# Patient Record
Sex: FEMALE | Race: WHITE | NOT HISPANIC OR LATINO | Employment: FULL TIME | URBAN - METROPOLITAN AREA
[De-identification: names, ages, dates, MRNs, and addresses within clinical notes are randomized per-mention and may not be internally consistent; named-entity substitution may affect disease eponyms.]

---

## 2019-09-10 PROBLEM — E03.8 HYPOTHYROIDISM DUE TO HASHIMOTO'S THYROIDITIS: Status: ACTIVE | Noted: 2019-09-10

## 2019-09-10 PROBLEM — E06.3 HYPOTHYROIDISM DUE TO HASHIMOTO'S THYROIDITIS: Status: ACTIVE | Noted: 2019-09-10

## 2019-09-10 PROBLEM — R53.83 FATIGUE, UNSPECIFIED TYPE: Status: ACTIVE | Noted: 2019-09-10

## 2019-09-10 PROBLEM — R14.0 ABDOMINAL BLOATING: Status: ACTIVE | Noted: 2019-09-10

## 2019-09-15 PROCEDURE — 82024 ASSAY OF ACTH: CPT | Performed by: INTERNAL MEDICINE

## 2020-09-22 ENCOUNTER — OFFICE VISIT (OUTPATIENT)
Dept: OBGYN | Age: 40
End: 2020-09-22

## 2020-09-22 ENCOUNTER — LAB SERVICES (OUTPATIENT)
Dept: LAB | Age: 40
End: 2020-09-22

## 2020-09-22 VITALS
BODY MASS INDEX: 26.02 KG/M2 | SYSTOLIC BLOOD PRESSURE: 92 MMHG | HEIGHT: 62 IN | RESPIRATION RATE: 16 BRPM | DIASTOLIC BLOOD PRESSURE: 72 MMHG | WEIGHT: 141.38 LBS

## 2020-09-22 DIAGNOSIS — Z11.51 SCREENING FOR HUMAN PAPILLOMAVIRUS (HPV): ICD-10-CM

## 2020-09-22 DIAGNOSIS — Z12.31 ENCOUNTER FOR SCREENING MAMMOGRAM FOR MALIGNANT NEOPLASM OF BREAST: ICD-10-CM

## 2020-09-22 DIAGNOSIS — Z01.419 ENCOUNTER FOR GYNECOLOGICAL EXAMINATION WITHOUT ABNORMAL FINDING: ICD-10-CM

## 2020-09-22 DIAGNOSIS — Z01.419 ENCOUNTER FOR GYNECOLOGICAL EXAMINATION WITHOUT ABNORMAL FINDING: Primary | ICD-10-CM

## 2020-09-22 DIAGNOSIS — R10.2 PELVIC PAIN IN FEMALE: ICD-10-CM

## 2020-09-22 LAB
BASOPHIL %: 0.3 % (ref 0–1.2)
BASOPHIL ABSOLUTE #: 0 10*3/UL (ref 0–0.1)
DIFFERENTIAL TYPE: ABNORMAL
EOSINOPHIL %: 1.5 % (ref 0–10)
EOSINOPHIL ABSOLUTE #: 0.1 10*3/UL (ref 0–0.5)
HEMATOCRIT: 41.8 % (ref 34–45)
HEMOGLOBIN: 13.4 G/DL (ref 11.2–15.7)
IMMATURE GRANULOCYTE ABSOLUTE: 0.01 10*3/UL (ref 0–0.05)
IMMATURE GRANULOCYTE PERCENT: 0.2 % (ref 0–0.5)
LYMPH PERCENT: 19.5 % (ref 20.5–51.1)
LYMPHOCYTE ABSOLUTE #: 1.2 10*3/UL (ref 1.2–3.4)
MEAN CORPUSCULAR HGB CONCENTRATION: 32.1 % (ref 32–36)
MEAN CORPUSCULAR HGB: 27.8 PG (ref 27–34)
MEAN CORPUSCULAR VOLUME: 86.7 FL (ref 79–95)
MEAN PLATELET VOLUME: 11.5 FL (ref 8.6–12.4)
MONOCYTE ABSOLUTE #: 0.5 10*3/UL (ref 0.2–0.9)
MONOCYTE PERCENT: 7.4 % (ref 4.3–12.9)
NEUTROPHIL ABSOLUTE #: 4.3 10*3/UL (ref 1.4–6.5)
NEUTROPHIL PERCENT: 71.1 % (ref 34–73.5)
PLATELET COUNT: 248 10*3/UL (ref 150–400)
RED BLOOD CELL COUNT: 4.82 10*6/UL (ref 3.7–5.2)
RED CELL DISTRIBUTION WIDTH: 12.8 % (ref 11.3–14.8)
WHITE BLOOD CELL COUNT: 6.1 10*3/UL (ref 4–10)

## 2020-09-22 PROCEDURE — 84443 ASSAY THYROID STIM HORMONE: CPT | Performed by: OBSTETRICS & GYNECOLOGY

## 2020-09-22 PROCEDURE — 88142 CYTOPATH C/V THIN LAYER: CPT | Performed by: PATHOLOGY

## 2020-09-22 PROCEDURE — 84439 ASSAY OF FREE THYROXINE: CPT | Performed by: OBSTETRICS & GYNECOLOGY

## 2020-09-22 PROCEDURE — 82947 ASSAY GLUCOSE BLOOD QUANT: CPT | Performed by: OBSTETRICS & GYNECOLOGY

## 2020-09-22 PROCEDURE — 99386 PREV VISIT NEW AGE 40-64: CPT | Performed by: OBSTETRICS & GYNECOLOGY

## 2020-09-22 PROCEDURE — 82670 ASSAY OF TOTAL ESTRADIOL: CPT | Performed by: OBSTETRICS & GYNECOLOGY

## 2020-09-22 PROCEDURE — 80061 LIPID PANEL: CPT | Performed by: OBSTETRICS & GYNECOLOGY

## 2020-09-22 PROCEDURE — 85025 COMPLETE CBC W/AUTO DIFF WBC: CPT | Performed by: OBSTETRICS & GYNECOLOGY

## 2020-09-22 PROCEDURE — 83001 ASSAY OF GONADOTROPIN (FSH): CPT | Performed by: OBSTETRICS & GYNECOLOGY

## 2020-09-22 PROCEDURE — 36415 COLL VENOUS BLD VENIPUNCTURE: CPT | Performed by: OBSTETRICS & GYNECOLOGY

## 2020-09-22 PROCEDURE — 83520 IMMUNOASSAY QUANT NOS NONAB: CPT | Performed by: OBSTETRICS & GYNECOLOGY

## 2020-09-22 RX ORDER — LEVOTHYROXINE SODIUM 137 UG/1
TABLET ORAL
COMMUNITY
Start: 2020-07-23 | End: 2022-11-11

## 2020-09-22 SDOH — HEALTH STABILITY: MENTAL HEALTH: HOW OFTEN DO YOU HAVE A DRINK CONTAINING ALCOHOL?: NEVER

## 2020-09-23 ENCOUNTER — E-ADVICE (OUTPATIENT)
Dept: OBGYN | Age: 40
End: 2020-09-23

## 2020-09-23 LAB
CHOLEST SERPL-MCNC: 128 MG/DL (ref 140–200)
ESTRADIOL SERPL-MCNC: 66.1 PG/ML
FSH SERPL-ACNC: 6.86 M[IU]/ML (ref 2–25)
GLUCOSE P FAST SERPL-MCNC: 91 MG/DL (ref 60–100)
HDLC SERPL-MCNC: 36 MG/DL
LDLC SERPL CALC-MCNC: 79 MG/DL (ref 30–100)
T4 FREE SERPL-MCNC: 0.9 NG/DL (ref 0.78–2.19)
TRIGL SERPL-MCNC: 67 MG/DL (ref 0–200)
TSH SERPL DL<=0.05 MIU/L-ACNC: 5.69 M[IU]/L (ref 0.3–4.82)

## 2020-09-26 ENCOUNTER — IMAGING SERVICES (OUTPATIENT)
Dept: MAMMOGRAPHY | Age: 40
End: 2020-09-26
Attending: OBSTETRICS & GYNECOLOGY

## 2020-09-26 DIAGNOSIS — Z12.31 ENCOUNTER FOR SCREENING MAMMOGRAM FOR MALIGNANT NEOPLASM OF BREAST: ICD-10-CM

## 2020-09-26 PROCEDURE — 77067 SCR MAMMO BI INCL CAD: CPT | Performed by: RADIOLOGY

## 2020-09-26 PROCEDURE — 77063 BREAST TOMOSYNTHESIS BI: CPT | Performed by: RADIOLOGY

## 2020-09-27 LAB — MIS SERPL-MCNC: 2.24 NG/ML

## 2020-09-28 ENCOUNTER — IMAGING SERVICES (OUTPATIENT)
Dept: ULTRASOUND IMAGING | Age: 40
End: 2020-09-28
Attending: OBSTETRICS & GYNECOLOGY

## 2020-09-28 DIAGNOSIS — R10.2 PELVIC PAIN IN FEMALE: ICD-10-CM

## 2020-09-28 PROCEDURE — 76856 US EXAM PELVIC COMPLETE: CPT | Performed by: RADIOLOGY

## 2020-09-28 PROCEDURE — 76830 TRANSVAGINAL US NON-OB: CPT | Performed by: RADIOLOGY

## 2020-09-30 ENCOUNTER — E-ADVICE (OUTPATIENT)
Dept: OBGYN | Age: 40
End: 2020-09-30

## 2020-09-30 ENCOUNTER — OFFICE VISIT (OUTPATIENT)
Dept: UROGYNECOLOGY | Age: 40
End: 2020-09-30

## 2020-09-30 VITALS
WEIGHT: 141.4 LBS | BODY MASS INDEX: 25.86 KG/M2 | HEART RATE: 76 BPM | RESPIRATION RATE: 14 BRPM | SYSTOLIC BLOOD PRESSURE: 100 MMHG | DIASTOLIC BLOOD PRESSURE: 60 MMHG

## 2020-09-30 DIAGNOSIS — N81.3 UTERINE PROCIDENTIA: ICD-10-CM

## 2020-09-30 DIAGNOSIS — N39.46 MIXED STRESS AND URGE URINARY INCONTINENCE: Primary | ICD-10-CM

## 2020-09-30 DIAGNOSIS — N81.9 VAGINAL VAULT PROLAPSE: ICD-10-CM

## 2020-09-30 DIAGNOSIS — N81.11 CYSTOCELE, MIDLINE: ICD-10-CM

## 2020-09-30 DIAGNOSIS — R92.30 DENSE BREAST TISSUE: Primary | ICD-10-CM

## 2020-09-30 DIAGNOSIS — N81.6 RECTOCELE: ICD-10-CM

## 2020-09-30 DIAGNOSIS — N81.89 LOSS OF PERINEAL BODY, FEMALE: ICD-10-CM

## 2020-09-30 DIAGNOSIS — Z12.39 SCREENING FOR MALIGNANT NEOPLASM OF BREAST: ICD-10-CM

## 2020-09-30 LAB — AP REPORT: NORMAL

## 2020-09-30 PROCEDURE — 99244 OFF/OP CNSLTJ NEW/EST MOD 40: CPT | Performed by: OBSTETRICS & GYNECOLOGY

## 2020-09-30 RX ORDER — LEVOTHYROXINE SODIUM 0.15 MG/1
150 TABLET ORAL EVERY MORNING
COMMUNITY
Start: 2020-09-29 | End: 2023-10-26 | Stop reason: SDUPTHER

## 2020-10-01 ENCOUNTER — TELEPHONE (OUTPATIENT)
Dept: OBGYN | Age: 40
End: 2020-10-01

## 2020-10-05 LAB — HPV I/H RISK 4 DNA CVX QL NAA+PROBE: NORMAL

## 2020-10-08 ENCOUNTER — TELEPHONE (OUTPATIENT)
Dept: OBGYN | Age: 40
End: 2020-10-08

## 2020-10-12 ENCOUNTER — E-ADVICE (OUTPATIENT)
Dept: OBGYN | Age: 40
End: 2020-10-12

## 2020-10-20 ENCOUNTER — TELEPHONE (OUTPATIENT)
Dept: OBGYN | Age: 40
End: 2020-10-20

## 2020-10-27 ENCOUNTER — HOSPITAL (OUTPATIENT)
Dept: OTHER | Age: 40
End: 2020-10-27
Attending: OBSTETRICS & GYNECOLOGY

## 2020-11-04 ENCOUNTER — OFFICE VISIT (OUTPATIENT)
Dept: UROGYNECOLOGY | Age: 40
End: 2020-11-04

## 2020-11-04 DIAGNOSIS — N81.6 RECTOCELE: ICD-10-CM

## 2020-11-04 DIAGNOSIS — N81.11 CYSTOCELE, MIDLINE: ICD-10-CM

## 2020-11-04 DIAGNOSIS — N39.46 MIXED STRESS AND URGE URINARY INCONTINENCE: Primary | ICD-10-CM

## 2020-11-04 DIAGNOSIS — N39.8 VOIDING DYSFUNCTION: ICD-10-CM

## 2020-11-04 DIAGNOSIS — N81.9 VAGINAL VAULT PROLAPSE: ICD-10-CM

## 2020-11-04 DIAGNOSIS — N81.3 UTERINE PROCIDENTIA: ICD-10-CM

## 2020-11-04 DIAGNOSIS — N81.89 LOSS OF PERINEAL BODY, FEMALE: ICD-10-CM

## 2020-11-04 PROCEDURE — 51797 INTRAABDOMINAL PRESSURE TEST: CPT | Performed by: OBSTETRICS & GYNECOLOGY

## 2020-11-04 PROCEDURE — 51798 US URINE CAPACITY MEASURE: CPT | Performed by: OBSTETRICS & GYNECOLOGY

## 2020-11-04 PROCEDURE — 51729 CYSTOMETROGRAM W/VP&UP: CPT | Performed by: OBSTETRICS & GYNECOLOGY

## 2020-11-04 PROCEDURE — 51784 ANAL/URINARY MUSCLE STUDY: CPT | Performed by: OBSTETRICS & GYNECOLOGY

## 2020-11-04 PROCEDURE — 51741 ELECTRO-UROFLOWMETRY FIRST: CPT | Performed by: OBSTETRICS & GYNECOLOGY

## 2020-11-04 PROCEDURE — 87086 URINE CULTURE/COLONY COUNT: CPT | Performed by: OBSTETRICS & GYNECOLOGY

## 2020-11-05 LAB — FINAL REPORT: NORMAL

## 2020-11-11 ENCOUNTER — APPOINTMENT (OUTPATIENT)
Dept: UROGYNECOLOGY | Age: 40
End: 2020-11-11

## 2020-11-17 ENCOUNTER — E-ADVICE (OUTPATIENT)
Dept: UROGYNECOLOGY | Age: 40
End: 2020-11-17

## 2020-11-21 ENCOUNTER — HOSPITAL ENCOUNTER (OUTPATIENT)
Age: 40
End: 2020-11-21
Attending: OBSTETRICS & GYNECOLOGY | Admitting: OBSTETRICS & GYNECOLOGY

## 2020-11-21 DIAGNOSIS — Z01.812 PRE-PROCEDURAL LABORATORY EXAMINATION: Primary | ICD-10-CM

## 2020-12-02 ENCOUNTER — OFFICE VISIT (OUTPATIENT)
Dept: UROGYNECOLOGY | Age: 40
End: 2020-12-02

## 2020-12-02 VITALS — BODY MASS INDEX: 25.4 KG/M2 | WEIGHT: 138 LBS | HEIGHT: 62 IN

## 2020-12-02 VITALS
WEIGHT: 138 LBS | TEMPERATURE: 97.9 F | DIASTOLIC BLOOD PRESSURE: 60 MMHG | SYSTOLIC BLOOD PRESSURE: 90 MMHG | HEIGHT: 62 IN | BODY MASS INDEX: 25.4 KG/M2 | HEART RATE: 74 BPM

## 2020-12-02 DIAGNOSIS — N81.11 CYSTOCELE, MIDLINE: Primary | ICD-10-CM

## 2020-12-02 DIAGNOSIS — N81.6 RECTOCELE: ICD-10-CM

## 2020-12-02 DIAGNOSIS — N81.3 UTERINE PROCIDENTIA: ICD-10-CM

## 2020-12-02 DIAGNOSIS — N39.46 MIXED STRESS AND URGE URINARY INCONTINENCE: ICD-10-CM

## 2020-12-02 DIAGNOSIS — N81.9 VAGINAL VAULT PROLAPSE: ICD-10-CM

## 2020-12-02 DIAGNOSIS — N39.8 VOIDING DYSFUNCTION: ICD-10-CM

## 2020-12-02 DIAGNOSIS — N81.89 LOSS OF PERINEAL BODY, FEMALE: ICD-10-CM

## 2020-12-02 PROCEDURE — 99214 OFFICE O/P EST MOD 30 MIN: CPT | Performed by: OBSTETRICS & GYNECOLOGY

## 2020-12-02 SDOH — HEALTH STABILITY: MENTAL HEALTH: HOW OFTEN DO YOU HAVE A DRINK CONTAINING ALCOHOL?: NEVER

## 2020-12-02 ASSESSMENT — COGNITIVE AND FUNCTIONAL STATUS - GENERAL
ARE YOU BLIND OR DO YOU HAVE SERIOUS DIFFICULTY SEEING, EVEN WHEN WEARING GLASSES: NO
ARE YOU DEAF OR DO YOU HAVE SERIOUS DIFFICULTY  HEARING: NO

## 2020-12-02 ASSESSMENT — ACTIVITIES OF DAILY LIVING (ADL)
ADL_BEFORE_ADMISSION: INDEPENDENT
ADL_SCORE: 12
HISTORY OF FALLING IN THE LAST YEAR (PRIOR TO ADMISSION): NO
ADL_SHORT_OF_BREATH: NO

## 2020-12-03 ENCOUNTER — TELEPHONE (OUTPATIENT)
Dept: OBGYN | Age: 40
End: 2020-12-03

## 2020-12-05 ENCOUNTER — APPOINTMENT (OUTPATIENT)
Dept: LAB | Age: 40
End: 2020-12-05
Attending: OBSTETRICS & GYNECOLOGY

## 2021-01-27 ENCOUNTER — APPOINTMENT (OUTPATIENT)
Dept: UROGYNECOLOGY | Age: 41
End: 2021-01-27

## 2022-10-06 ENCOUNTER — E-ADVICE (OUTPATIENT)
Dept: OBGYN | Age: 42
End: 2022-10-06

## 2022-10-06 ENCOUNTER — TELEPHONE (OUTPATIENT)
Dept: OBGYN | Age: 42
End: 2022-10-06

## 2022-10-06 ENCOUNTER — OFFICE VISIT (OUTPATIENT)
Dept: OBGYN | Age: 42
End: 2022-10-06

## 2022-10-06 ENCOUNTER — LAB REQUISITION (OUTPATIENT)
Dept: LAB | Age: 42
End: 2022-10-06

## 2022-10-06 VITALS
BODY MASS INDEX: 25.95 KG/M2 | SYSTOLIC BLOOD PRESSURE: 112 MMHG | WEIGHT: 141 LBS | HEIGHT: 62 IN | DIASTOLIC BLOOD PRESSURE: 72 MMHG

## 2022-10-06 DIAGNOSIS — R30.0 DYSURIA: ICD-10-CM

## 2022-10-06 DIAGNOSIS — R39.9 UTI SYMPTOMS: Primary | ICD-10-CM

## 2022-10-06 DIAGNOSIS — N89.8 VAGINAL DISCHARGE: ICD-10-CM

## 2022-10-06 DIAGNOSIS — R39.9 UNSPECIFIED SYMPTOMS AND SIGNS INVOLVING THE GENITOURINARY SYSTEM: ICD-10-CM

## 2022-10-06 LAB
BILIRUBIN URINE: NEGATIVE
BLOOD URINE: NEGATIVE
CLARITY: ABNORMAL
COLOR: YELLOW
GLUCOSE QUALITATIVE U: NEGATIVE
KETONES, URINE: ABNORMAL
LEUKOCYTE ESTERASE URINE: NEGATIVE
MUCOUS: ABNORMAL
NITRITE URINE: NEGATIVE
PH URINE: 6 (ref 5–7)
RED BLOOD CELLS URINE: ABNORMAL (ref 0–3)
SPECIFIC GRAVITY URINE: 1.03 (ref 1–1.03)
SQUAMOUS EPITHELIAL CELLS: ABNORMAL
URINE PROTEIN, QUAL (DIPSTICK): 30
UROBILINOGEN URINE: 2
WHITE BLOOD CELLS URINE: 0 (ref 0–5)

## 2022-10-06 PROCEDURE — 99214 OFFICE O/P EST MOD 30 MIN: CPT | Performed by: OBSTETRICS & GYNECOLOGY

## 2022-10-06 PROCEDURE — 87086 URINE CULTURE/COLONY COUNT: CPT | Performed by: OBSTETRICS & GYNECOLOGY

## 2022-10-06 PROCEDURE — PSEU9005 URINE, BACTERIAL CULTURE: Performed by: CLINICAL MEDICAL LABORATORY

## 2022-10-06 PROCEDURE — 81003 URINALYSIS AUTO W/O SCOPE: CPT | Performed by: OBSTETRICS & GYNECOLOGY

## 2022-10-06 PROCEDURE — 87086 URINE CULTURE/COLONY COUNT: CPT | Performed by: CLINICAL MEDICAL LABORATORY

## 2022-10-06 RX ORDER — CEPHALEXIN 500 MG/1
500 CAPSULE ORAL 3 TIMES DAILY
Qty: 21 CAPSULE | Refills: 0 | Status: SHIPPED | OUTPATIENT
Start: 2022-10-06 | End: 2022-10-13

## 2022-10-06 RX ORDER — ONDANSETRON HYDROCHLORIDE 8 MG/1
8 TABLET, FILM COATED ORAL EVERY 8 HOURS PRN
Qty: 20 TABLET | Refills: 0 | Status: SHIPPED | OUTPATIENT
Start: 2022-10-06 | End: 2022-11-11

## 2022-10-08 LAB
BACTERIA UR CULT: NORMAL
BACTERIA UR CULT: NORMAL

## 2022-10-17 ENCOUNTER — APPOINTMENT (OUTPATIENT)
Dept: OBGYN | Age: 42
End: 2022-10-17

## 2022-10-18 ENCOUNTER — APPOINTMENT (OUTPATIENT)
Dept: OBGYN | Age: 42
End: 2022-10-18

## 2022-11-07 ENCOUNTER — APPOINTMENT (OUTPATIENT)
Dept: OBGYN | Age: 42
End: 2022-11-07

## 2022-11-11 ENCOUNTER — APPOINTMENT (OUTPATIENT)
Dept: OBGYN | Age: 42
End: 2022-11-11

## 2022-11-11 ENCOUNTER — OFFICE VISIT (OUTPATIENT)
Dept: OBGYN | Age: 42
End: 2022-11-11

## 2022-11-11 ENCOUNTER — LAB SERVICES (OUTPATIENT)
Dept: LAB | Age: 42
End: 2022-11-11

## 2022-11-11 VITALS
WEIGHT: 146.38 LBS | BODY MASS INDEX: 26.77 KG/M2 | SYSTOLIC BLOOD PRESSURE: 102 MMHG | DIASTOLIC BLOOD PRESSURE: 70 MMHG

## 2022-11-11 DIAGNOSIS — Z12.31 ENCOUNTER FOR SCREENING MAMMOGRAM FOR MALIGNANT NEOPLASM OF BREAST: Primary | ICD-10-CM

## 2022-11-11 DIAGNOSIS — R79.89 LOW VITAMIN D LEVEL: ICD-10-CM

## 2022-11-11 DIAGNOSIS — Z12.31 ENCOUNTER FOR SCREENING MAMMOGRAM FOR MALIGNANT NEOPLASM OF BREAST: ICD-10-CM

## 2022-11-11 DIAGNOSIS — Z01.419 ENCOUNTER FOR GYNECOLOGICAL EXAMINATION WITHOUT ABNORMAL FINDING: ICD-10-CM

## 2022-11-11 DIAGNOSIS — Z11.51 SCREENING FOR HUMAN PAPILLOMAVIRUS (HPV): ICD-10-CM

## 2022-11-11 DIAGNOSIS — Z11.3 SCREEN FOR STD (SEXUALLY TRANSMITTED DISEASE): ICD-10-CM

## 2022-11-11 DIAGNOSIS — Z11.8 SCREENING FOR CHLAMYDIAL DISEASE: ICD-10-CM

## 2022-11-11 DIAGNOSIS — E04.1 THYROID NODULE: ICD-10-CM

## 2022-11-11 LAB
25(OH)D3+25(OH)D2 SERPL-MCNC: 34.9 NG/ML (ref 30–100)
ALBUMIN SERPL-MCNC: 3.6 G/DL (ref 3.6–5.1)
ALBUMIN/GLOB SERPL: 1.2 {RATIO} (ref 1–2.4)
ALP SERPL-CCNC: 54 UNITS/L (ref 45–117)
ALT SERPL-CCNC: 23 UNITS/L
ANION GAP SERPL CALC-SCNC: 9 MMOL/L (ref 7–19)
AST SERPL-CCNC: 23 UNITS/L
BASOPHILS # BLD: 0 K/MCL (ref 0–0.3)
BASOPHILS NFR BLD: 1 %
BILIRUB SERPL-MCNC: 0.5 MG/DL (ref 0.2–1)
BUN SERPL-MCNC: 15 MG/DL (ref 6–20)
BUN/CREAT SERPL: 19 (ref 7–25)
CALCIUM SERPL-MCNC: 8.5 MG/DL (ref 8.4–10.2)
CHLORIDE SERPL-SCNC: 103 MMOL/L (ref 97–110)
CHOLEST SERPL-MCNC: 125 MG/DL
CHOLEST/HDLC SERPL: 2.3 {RATIO}
CO2 SERPL-SCNC: 32 MMOL/L (ref 21–32)
CREAT SERPL-MCNC: 0.78 MG/DL (ref 0.51–0.95)
DEPRECATED RDW RBC: 39.8 FL (ref 39–50)
EOSINOPHIL # BLD: 0.1 K/MCL (ref 0–0.5)
EOSINOPHIL NFR BLD: 3 %
ERYTHROCYTE [DISTWIDTH] IN BLOOD: 12.2 % (ref 11–15)
FASTING DURATION TIME PATIENT: 10 HOURS (ref 0–999)
FASTING DURATION TIME PATIENT: 10 HOURS (ref 0–999)
GFR SERPLBLD BASED ON 1.73 SQ M-ARVRAT: >90 ML/MIN
GLOBULIN SER-MCNC: 3.1 G/DL (ref 2–4)
GLUCOSE SERPL-MCNC: 87 MG/DL (ref 70–99)
HBV SURFACE AG SER QL: NEGATIVE
HCT VFR BLD CALC: 40.6 % (ref 36–46.5)
HCV AB SER QL: NEGATIVE
HDLC SERPL-MCNC: 54 MG/DL
HGB BLD-MCNC: 13.2 G/DL (ref 12–15.5)
HIV 1+2 AB+HIV1 P24 AG SERPL QL IA: NONREACTIVE
IMM GRANULOCYTES # BLD AUTO: 0 K/MCL (ref 0–0.2)
IMM GRANULOCYTES # BLD: 0 %
LDLC SERPL CALC-MCNC: 61 MG/DL
LYMPHOCYTES # BLD: 1 K/MCL (ref 1–4.8)
LYMPHOCYTES NFR BLD: 23 %
MCH RBC QN AUTO: 28.9 PG (ref 26–34)
MCHC RBC AUTO-ENTMCNC: 32.5 G/DL (ref 32–36.5)
MCV RBC AUTO: 89 FL (ref 78–100)
MONOCYTES # BLD: 0.4 K/MCL (ref 0.3–0.9)
MONOCYTES NFR BLD: 9 %
NEUTROPHILS # BLD: 2.7 K/MCL (ref 1.8–7.7)
NEUTROPHILS NFR BLD: 64 %
NONHDLC SERPL-MCNC: 71 MG/DL
NRBC BLD MANUAL-RTO: 0 /100 WBC
PLATELET # BLD AUTO: 216 K/MCL (ref 140–450)
POTASSIUM SERPL-SCNC: 4.6 MMOL/L (ref 3.4–5.1)
PROT SERPL-MCNC: 6.7 G/DL (ref 6.4–8.2)
RBC # BLD: 4.56 MIL/MCL (ref 4–5.2)
SODIUM SERPL-SCNC: 139 MMOL/L (ref 135–145)
T3FREE SERPL-MCNC: 2.1 PG/ML (ref 2.2–4)
T4 FREE SERPL-MCNC: 1.1 NG/DL (ref 0.8–1.5)
TRIGL SERPL-MCNC: 48 MG/DL
TSH SERPL-ACNC: 3.11 MCUNITS/ML (ref 0.35–5)
WBC # BLD: 4.3 K/MCL (ref 4.2–11)

## 2022-11-11 PROCEDURE — 87340 HEPATITIS B SURFACE AG IA: CPT | Performed by: INTERNAL MEDICINE

## 2022-11-11 PROCEDURE — 82306 VITAMIN D 25 HYDROXY: CPT | Performed by: INTERNAL MEDICINE

## 2022-11-11 PROCEDURE — 88142 CYTOPATH C/V THIN LAYER: CPT | Performed by: INTERNAL MEDICINE

## 2022-11-11 PROCEDURE — 80050 GENERAL HEALTH PANEL: CPT | Performed by: INTERNAL MEDICINE

## 2022-11-11 PROCEDURE — 87624 HPV HI-RISK TYP POOLED RSLT: CPT | Performed by: INTERNAL MEDICINE

## 2022-11-11 PROCEDURE — 84439 ASSAY OF FREE THYROXINE: CPT | Performed by: INTERNAL MEDICINE

## 2022-11-11 PROCEDURE — 36415 COLL VENOUS BLD VENIPUNCTURE: CPT | Performed by: OBSTETRICS & GYNECOLOGY

## 2022-11-11 PROCEDURE — 86592 SYPHILIS TEST NON-TREP QUAL: CPT | Performed by: INTERNAL MEDICINE

## 2022-11-11 PROCEDURE — 84481 FREE ASSAY (FT-3): CPT | Performed by: INTERNAL MEDICINE

## 2022-11-11 PROCEDURE — 99396 PREV VISIT EST AGE 40-64: CPT | Performed by: OBSTETRICS & GYNECOLOGY

## 2022-11-11 PROCEDURE — 87491 CHLMYD TRACH DNA AMP PROBE: CPT | Performed by: INTERNAL MEDICINE

## 2022-11-11 PROCEDURE — 87661 TRICHOMONAS VAGINALIS AMPLIF: CPT | Performed by: INTERNAL MEDICINE

## 2022-11-11 PROCEDURE — 87591 N.GONORRHOEAE DNA AMP PROB: CPT | Performed by: INTERNAL MEDICINE

## 2022-11-11 PROCEDURE — 87389 HIV-1 AG W/HIV-1&-2 AB AG IA: CPT | Performed by: INTERNAL MEDICINE

## 2022-11-11 PROCEDURE — 80061 LIPID PANEL: CPT | Performed by: INTERNAL MEDICINE

## 2022-11-11 PROCEDURE — 86803 HEPATITIS C AB TEST: CPT | Performed by: INTERNAL MEDICINE

## 2022-11-11 RX ORDER — LEVOTHYROXINE SODIUM 0.15 MG/1
TABLET ORAL DAILY
COMMUNITY
Start: 2022-08-11

## 2022-11-12 ENCOUNTER — E-ADVICE (OUTPATIENT)
Dept: OBGYN | Age: 42
End: 2022-11-12

## 2022-11-12 LAB — RPR SER QL: NONREACTIVE

## 2022-11-14 LAB
C TRACH RRNA SPEC QL NAA+PROBE: NORMAL
Lab: NORMAL
N GONORRHOEA RRNA SPEC QL NAA+PROBE: NORMAL
T VAGINALIS RRNA SPEC QL NAA+PROBE: NEGATIVE

## 2022-11-15 ENCOUNTER — OFFICE VISIT (OUTPATIENT)
Dept: OBGYN | Age: 42
End: 2022-11-15

## 2022-11-15 VITALS
TEMPERATURE: 98.9 F | BODY MASS INDEX: 26.67 KG/M2 | WEIGHT: 145.8 LBS | HEART RATE: 86 BPM | DIASTOLIC BLOOD PRESSURE: 70 MMHG | RESPIRATION RATE: 12 BRPM | SYSTOLIC BLOOD PRESSURE: 110 MMHG

## 2022-11-15 DIAGNOSIS — N93.9 ABNORMAL UTERINE BLEEDING: ICD-10-CM

## 2022-11-15 DIAGNOSIS — Z01.818 PRE-OP TESTING: Primary | ICD-10-CM

## 2022-11-15 DIAGNOSIS — Z30.430 ENCOUNTER FOR INSERTION OF INTRAUTERINE CONTRACEPTIVE DEVICE (IUD): ICD-10-CM

## 2022-11-15 PROCEDURE — 58300 INSERT INTRAUTERINE DEVICE: CPT | Performed by: OBSTETRICS & GYNECOLOGY

## 2022-11-16 LAB
CASE RPRT: NORMAL
CLINICAL INFO: NORMAL
CYTOLOGY CVX/VAG STUDY: NORMAL
HPV16+18+45 E6+E7MRNA CVX NAA+PROBE: NEGATIVE
Lab: NORMAL
PAP EDUCATIONAL NOTE: NORMAL
SPECIMEN ADEQUACY: NORMAL

## 2022-11-29 ENCOUNTER — E-ADVICE (OUTPATIENT)
Dept: OBGYN | Age: 42
End: 2022-11-29

## 2022-12-09 ENCOUNTER — IMAGING SERVICES (OUTPATIENT)
Dept: ULTRASOUND IMAGING | Age: 42
End: 2022-12-09
Attending: OBSTETRICS & GYNECOLOGY

## 2022-12-09 ENCOUNTER — IMAGING SERVICES (OUTPATIENT)
Dept: MAMMOGRAPHY | Age: 42
End: 2022-12-09
Attending: OBSTETRICS & GYNECOLOGY

## 2022-12-09 DIAGNOSIS — Z12.31 ENCOUNTER FOR SCREENING MAMMOGRAM FOR MALIGNANT NEOPLASM OF BREAST: ICD-10-CM

## 2022-12-09 DIAGNOSIS — E04.1 THYROID NODULE: ICD-10-CM

## 2022-12-09 PROCEDURE — 76536 US EXAM OF HEAD AND NECK: CPT | Performed by: RADIOLOGY

## 2022-12-09 PROCEDURE — 77067 SCR MAMMO BI INCL CAD: CPT | Performed by: RADIOLOGY

## 2022-12-09 PROCEDURE — 77063 BREAST TOMOSYNTHESIS BI: CPT | Performed by: RADIOLOGY

## 2023-01-03 ENCOUNTER — OFFICE VISIT (OUTPATIENT)
Dept: OBGYN | Age: 43
End: 2023-01-03

## 2023-01-03 VITALS
TEMPERATURE: 98 F | WEIGHT: 150 LBS | BODY MASS INDEX: 27.44 KG/M2 | RESPIRATION RATE: 12 BRPM | HEART RATE: 78 BPM | SYSTOLIC BLOOD PRESSURE: 110 MMHG | DIASTOLIC BLOOD PRESSURE: 72 MMHG

## 2023-01-03 DIAGNOSIS — N92.1 MENORRHAGIA WITH IRREGULAR CYCLE: Primary | ICD-10-CM

## 2023-01-03 PROCEDURE — 99214 OFFICE O/P EST MOD 30 MIN: CPT | Performed by: OBSTETRICS & GYNECOLOGY

## 2023-01-03 RX ORDER — TRAMADOL HYDROCHLORIDE 50 MG/1
50 TABLET ORAL 4 TIMES DAILY PRN
COMMUNITY
Start: 2022-12-01

## 2023-01-03 RX ORDER — CYCLOBENZAPRINE HCL 10 MG
TABLET ORAL
COMMUNITY
Start: 2022-12-14

## 2023-01-03 RX ORDER — HYDROCODONE BITARTRATE AND ACETAMINOPHEN 5; 325 MG/1; MG/1
TABLET ORAL
COMMUNITY
Start: 2022-12-15

## 2023-01-03 RX ORDER — GABAPENTIN 300 MG/1
300 CAPSULE ORAL 3 TIMES DAILY
COMMUNITY
Start: 2022-12-14 | End: 2023-10-26 | Stop reason: ALTCHOICE

## 2023-01-04 ENCOUNTER — IMAGING SERVICES (OUTPATIENT)
Dept: ULTRASOUND IMAGING | Age: 43
End: 2023-01-04
Attending: OBSTETRICS & GYNECOLOGY

## 2023-01-04 DIAGNOSIS — N92.1 MENORRHAGIA WITH IRREGULAR CYCLE: ICD-10-CM

## 2023-01-04 PROCEDURE — 76830 TRANSVAGINAL US NON-OB: CPT | Performed by: RADIOLOGY

## 2023-01-04 PROCEDURE — 76856 US EXAM PELVIC COMPLETE: CPT | Performed by: RADIOLOGY

## 2023-01-30 ENCOUNTER — APPOINTMENT (OUTPATIENT)
Dept: OBGYN | Age: 43
End: 2023-01-30

## 2023-04-06 ENCOUNTER — TELEPHONE (OUTPATIENT)
Dept: OBGYN | Age: 43
End: 2023-04-06

## 2023-04-06 ENCOUNTER — E-ADVICE (OUTPATIENT)
Dept: OBGYN | Age: 43
End: 2023-04-06

## 2023-10-26 ENCOUNTER — MED INFO FORMS (OUTPATIENT)
Dept: HEALTH INFORMATION MANAGEMENT | Facility: OTHER | Age: 43
End: 2023-10-26

## 2023-10-26 ENCOUNTER — E-ADVICE (OUTPATIENT)
Dept: OBGYN | Age: 43
End: 2023-10-26

## 2023-10-26 ENCOUNTER — OFFICE VISIT (OUTPATIENT)
Dept: OBGYN | Age: 43
End: 2023-10-26

## 2023-10-26 VITALS
BODY MASS INDEX: 26.75 KG/M2 | HEART RATE: 68 BPM | HEIGHT: 62 IN | DIASTOLIC BLOOD PRESSURE: 76 MMHG | WEIGHT: 145.38 LBS | SYSTOLIC BLOOD PRESSURE: 100 MMHG | RESPIRATION RATE: 12 BRPM

## 2023-10-26 DIAGNOSIS — F32.89 OTHER DEPRESSION: Primary | ICD-10-CM

## 2023-10-26 PROCEDURE — 99215 OFFICE O/P EST HI 40 MIN: CPT | Performed by: STUDENT IN AN ORGANIZED HEALTH CARE EDUCATION/TRAINING PROGRAM

## 2023-10-26 RX ORDER — KETOCONAZOLE 20 MG/ML
SHAMPOO TOPICAL
COMMUNITY
Start: 2023-10-11

## 2023-10-26 RX ORDER — TRETINOIN 1 MG/G
1 CREAM TOPICAL NIGHTLY
COMMUNITY
Start: 2023-10-06

## 2023-10-26 RX ORDER — CLOBETASOL PROPIONATE 0.46 MG/ML
SOLUTION TOPICAL
COMMUNITY
Start: 2023-10-11

## 2023-10-27 RX ORDER — ESCITALOPRAM OXALATE 10 MG/1
10 TABLET ORAL DAILY
Qty: 90 TABLET | Refills: 3 | Status: SHIPPED | OUTPATIENT
Start: 2023-10-27 | End: 2024-10-26

## 2023-11-02 ENCOUNTER — E-ADVICE (OUTPATIENT)
Dept: HEALTH INFORMATION MANAGEMENT | Facility: OTHER | Age: 43
End: 2023-11-02

## 2023-11-13 ENCOUNTER — E-ADVICE (OUTPATIENT)
Dept: OBGYN | Age: 43
End: 2023-11-13

## 2023-11-13 DIAGNOSIS — L65.9 HAIR LOSS: Primary | ICD-10-CM

## 2023-11-27 ENCOUNTER — APPOINTMENT (OUTPATIENT)
Dept: OBGYN | Age: 43
End: 2023-11-27

## 2023-12-05 ENCOUNTER — E-ADVICE (OUTPATIENT)
Dept: OBGYN | Age: 43
End: 2023-12-05

## 2023-12-05 RX ORDER — NITROFURANTOIN 25; 75 MG/1; MG/1
100 CAPSULE ORAL 2 TIMES DAILY
Qty: 10 CAPSULE | Refills: 0 | Status: SHIPPED | OUTPATIENT
Start: 2023-12-05

## 2024-01-18 ENCOUNTER — E-ADVICE (OUTPATIENT)
Dept: OBGYN | Age: 44
End: 2024-01-18

## 2024-01-18 DIAGNOSIS — R39.9 UTI SYMPTOMS: Primary | ICD-10-CM

## 2024-01-18 RX ORDER — NITROFURANTOIN 25; 75 MG/1; MG/1
100 CAPSULE ORAL 2 TIMES DAILY
Qty: 10 CAPSULE | Refills: 0 | OUTPATIENT
Start: 2024-01-18

## 2024-01-18 RX ORDER — NITROFURANTOIN 25; 75 MG/1; MG/1
100 CAPSULE ORAL 2 TIMES DAILY
Qty: 10 CAPSULE | Refills: 0 | Status: SHIPPED | OUTPATIENT
Start: 2024-01-18

## 2024-01-18 RX ORDER — NITROFURANTOIN 25; 75 MG/1; MG/1
100 CAPSULE ORAL 2 TIMES DAILY
Qty: 10 CAPSULE | Refills: 0 | Status: CANCELLED | OUTPATIENT
Start: 2024-01-18

## 2024-02-01 ENCOUNTER — WALK IN (OUTPATIENT)
Dept: URGENT CARE | Age: 44
End: 2024-02-01
Attending: EMERGENCY MEDICINE

## 2024-02-01 ENCOUNTER — HOSPITAL ENCOUNTER (EMERGENCY)
Age: 44
Discharge: LEFT BEFORE DISCHARGE | End: 2024-02-01

## 2024-02-01 ENCOUNTER — E-ADVICE (OUTPATIENT)
Dept: OBGYN | Age: 44
End: 2024-02-01

## 2024-02-01 VITALS
HEART RATE: 84 BPM | RESPIRATION RATE: 16 BRPM | DIASTOLIC BLOOD PRESSURE: 87 MMHG | BODY MASS INDEX: 25.97 KG/M2 | OXYGEN SATURATION: 100 % | WEIGHT: 142 LBS | SYSTOLIC BLOOD PRESSURE: 124 MMHG | TEMPERATURE: 98.4 F

## 2024-02-01 VITALS
HEART RATE: 81 BPM | DIASTOLIC BLOOD PRESSURE: 86 MMHG | RESPIRATION RATE: 20 BRPM | SYSTOLIC BLOOD PRESSURE: 127 MMHG | TEMPERATURE: 98 F | OXYGEN SATURATION: 100 %

## 2024-02-01 DIAGNOSIS — N10 PYELONEPHRITIS, ACUTE: Primary | ICD-10-CM

## 2024-02-01 DIAGNOSIS — R10.9 ACUTE RIGHT FLANK PAIN: ICD-10-CM

## 2024-02-01 LAB
APPEARANCE UR: ABNORMAL
BILIRUB UR QL STRIP: NEGATIVE
COLOR UR: YELLOW
GLUCOSE UR STRIP-MCNC: NEGATIVE MG/DL
HCG UR QL: NEGATIVE
HGB UR QL STRIP: ABNORMAL
KETONES UR STRIP-MCNC: NEGATIVE MG/DL
LEUKOCYTE ESTERASE UR QL STRIP: ABNORMAL
NITRITE UR QL STRIP: POSITIVE
PH UR STRIP: 7 UNITS (ref 5–7)
PROT UR STRIP-MCNC: 100 MG/DL
SP GR UR STRIP: 1.02 (ref 1–1.03)
UROBILINOGEN UR STRIP-MCNC: 1 MG/DL

## 2024-02-01 PROCEDURE — 84703 CHORIONIC GONADOTROPIN ASSAY: CPT | Performed by: EMERGENCY MEDICINE

## 2024-02-01 PROCEDURE — 81003 URINALYSIS AUTO W/O SCOPE: CPT | Performed by: EMERGENCY MEDICINE

## 2024-02-01 ASSESSMENT — ENCOUNTER SYMPTOMS
COLOR CHANGE: 0
VOMITING: 0
BRUISES/BLEEDS EASILY: 0
DIZZINESS: 0
SHORTNESS OF BREATH: 0
ABDOMINAL PAIN: 0
NAUSEA: 0
POLYPHAGIA: 0
NUMBNESS: 0
ACTIVITY CHANGE: 0
BACK PAIN: 0
POLYDIPSIA: 0
CHILLS: 0
CONFUSION: 0
FEVER: 0
COUGH: 0
SORE THROAT: 0
EYE PAIN: 0
EYE REDNESS: 0
HEADACHES: 0
EYE DISCHARGE: 0
WHEEZING: 0
DIARRHEA: 0
WOUND: 0
FACIAL SWELLING: 0

## 2024-02-01 ASSESSMENT — PAIN SCALES - GENERAL
PAINLEVEL: 10
PAINLEVEL_OUTOF10: 10

## 2024-02-02 ENCOUNTER — HOSPITAL ENCOUNTER (EMERGENCY)
Facility: HOSPITAL | Age: 44
Discharge: HOME | End: 2024-02-02
Attending: EMERGENCY MEDICINE
Payer: COMMERCIAL

## 2024-02-02 VITALS
BODY MASS INDEX: 26.13 KG/M2 | TEMPERATURE: 98 F | DIASTOLIC BLOOD PRESSURE: 89 MMHG | SYSTOLIC BLOOD PRESSURE: 154 MMHG | RESPIRATION RATE: 18 BRPM | OXYGEN SATURATION: 100 % | HEIGHT: 62 IN | HEART RATE: 78 BPM | WEIGHT: 142 LBS

## 2024-02-02 DIAGNOSIS — R10.9 ACUTE RIGHT FLANK PAIN: Primary | ICD-10-CM

## 2024-02-02 LAB
ALBUMIN SERPL-MCNC: 4.2 G/DL (ref 3.2–4.8)
ALBUMIN/GLOB SERPL: 1.4 {RATIO} (ref 1–2)
ALP LIVER SERPL-CCNC: 56 U/L
ALT SERPL-CCNC: 8 U/L
ANION GAP SERPL CALC-SCNC: 2 MMOL/L (ref 0–18)
AST SERPL-CCNC: 19 U/L (ref ?–34)
B-HCG UR QL: NEGATIVE
BASOPHILS # BLD AUTO: 0.02 X10(3) UL (ref 0–0.2)
BASOPHILS NFR BLD AUTO: 0.3 %
BILIRUB SERPL-MCNC: 0.5 MG/DL (ref 0.3–1.2)
BILIRUB UR QL: NEGATIVE
BUN BLD-MCNC: 12 MG/DL (ref 9–23)
BUN/CREAT SERPL: 12.2 (ref 10–20)
CALCIUM BLD-MCNC: 8.8 MG/DL (ref 8.7–10.4)
CHLORIDE SERPL-SCNC: 107 MMOL/L (ref 98–112)
CLARITY UR: CLEAR
CO2 SERPL-SCNC: 28 MMOL/L (ref 21–32)
CREAT BLD-MCNC: 0.98 MG/DL
DEPRECATED RDW RBC AUTO: 39.9 FL (ref 35.1–46.3)
EGFRCR SERPLBLD CKD-EPI 2021: 73 ML/MIN/1.73M2 (ref 60–?)
EOSINOPHIL # BLD AUTO: 0.09 X10(3) UL (ref 0–0.7)
EOSINOPHIL NFR BLD AUTO: 1.5 %
ERYTHROCYTE [DISTWIDTH] IN BLOOD BY AUTOMATED COUNT: 12.8 % (ref 11–15)
GLOBULIN PLAS-MCNC: 2.9 G/DL (ref 2.8–4.4)
GLUCOSE BLD-MCNC: 98 MG/DL (ref 70–99)
GLUCOSE UR-MCNC: NORMAL MG/DL
HCT VFR BLD AUTO: 39.9 %
HGB BLD-MCNC: 13.3 G/DL
IMM GRANULOCYTES # BLD AUTO: 0.02 X10(3) UL (ref 0–1)
IMM GRANULOCYTES NFR BLD: 0.3 %
KETONES UR-MCNC: NEGATIVE MG/DL
LEUKOCYTE ESTERASE UR QL STRIP.AUTO: 25
LYMPHOCYTES # BLD AUTO: 1.41 X10(3) UL (ref 1–4)
LYMPHOCYTES NFR BLD AUTO: 23.5 %
MCH RBC QN AUTO: 28.8 PG (ref 26–34)
MCHC RBC AUTO-ENTMCNC: 33.3 G/DL (ref 31–37)
MCV RBC AUTO: 86.4 FL
MONOCYTES # BLD AUTO: 0.49 X10(3) UL (ref 0.1–1)
MONOCYTES NFR BLD AUTO: 8.2 %
NEUTROPHILS # BLD AUTO: 3.97 X10 (3) UL (ref 1.5–7.7)
NEUTROPHILS # BLD AUTO: 3.97 X10(3) UL (ref 1.5–7.7)
NEUTROPHILS NFR BLD AUTO: 66.2 %
NITRITE UR QL STRIP.AUTO: NEGATIVE
OSMOLALITY SERPL CALC.SUM OF ELEC: 284 MOSM/KG (ref 275–295)
PH UR: 6 [PH] (ref 5–8)
PLATELET # BLD AUTO: 220 10(3)UL (ref 150–450)
POTASSIUM SERPL-SCNC: 3.6 MMOL/L (ref 3.5–5.1)
PROT SERPL-MCNC: 7.1 G/DL (ref 5.7–8.2)
PROT UR-MCNC: NEGATIVE MG/DL
RBC # BLD AUTO: 4.62 X10(6)UL
SODIUM SERPL-SCNC: 137 MMOL/L (ref 136–145)
SP GR UR STRIP: 1.01 (ref 1–1.03)
UROBILINOGEN UR STRIP-ACNC: NORMAL
WBC # BLD AUTO: 6 X10(3) UL (ref 4–11)

## 2024-02-02 PROCEDURE — 81001 URINALYSIS AUTO W/SCOPE: CPT | Performed by: EMERGENCY MEDICINE

## 2024-02-02 PROCEDURE — 99284 EMERGENCY DEPT VISIT MOD MDM: CPT

## 2024-02-02 PROCEDURE — 85025 COMPLETE CBC W/AUTO DIFF WBC: CPT | Performed by: EMERGENCY MEDICINE

## 2024-02-02 PROCEDURE — 87086 URINE CULTURE/COLONY COUNT: CPT | Performed by: EMERGENCY MEDICINE

## 2024-02-02 PROCEDURE — 81025 URINE PREGNANCY TEST: CPT

## 2024-02-02 PROCEDURE — 99283 EMERGENCY DEPT VISIT LOW MDM: CPT

## 2024-02-02 PROCEDURE — 80053 COMPREHEN METABOLIC PANEL: CPT | Performed by: EMERGENCY MEDICINE

## 2024-02-02 PROCEDURE — 36415 COLL VENOUS BLD VENIPUNCTURE: CPT

## 2024-02-02 RX ORDER — CYCLOBENZAPRINE HCL 10 MG
1 TABLET ORAL 3 TIMES DAILY PRN
COMMUNITY
Start: 2022-07-01

## 2024-02-02 RX ORDER — ESCITALOPRAM OXALATE 10 MG/1
10 TABLET ORAL DAILY
COMMUNITY
Start: 2023-10-27 | End: 2024-10-26

## 2024-02-02 RX ORDER — GABAPENTIN 300 MG/1
CAPSULE ORAL
COMMUNITY
Start: 2022-08-30

## 2024-02-02 RX ORDER — HYDROCODONE BITARTRATE AND ACETAMINOPHEN 5; 325 MG/1; MG/1
1-2 TABLET ORAL
COMMUNITY
Start: 2022-12-15

## 2024-02-02 RX ORDER — ONDANSETRON HYDROCHLORIDE 8 MG/1
1 TABLET, FILM COATED ORAL EVERY 8 HOURS PRN
COMMUNITY
Start: 2022-10-06

## 2024-02-02 NOTE — ED PROVIDER NOTES
Patient Seen in: [facility] Emergency Department    History     Chief Complaint   Patient presents with    Urinary Symptoms       HPI    43-year-old female presents the ER with complaint of right flank pain and right groin pain since yesterday.  Patient states she was seen in urgent care instructed to go to the ER but waited at [facility] for 6 hours and states she went home.  Patient with a past medical history of chronic low back pain states she took hydrocodone for the pain.  Patient states her pain is significant proved today but is concerned because she recently took Macrobid for urinary tract infection.  Patient denies any burning with urination today.    History reviewed.   Past Medical History:   Diagnosis Date    Hashimoto's disease        History reviewed. History reviewed. No pertinent surgical history.      Medications :  (Not in a hospital admission)       Family History   Problem Relation Age of Onset    Diabetes Father     Other (Other) Father         hypothyroidism    Heart Disorder Maternal Grandfather     Other (CVA) Maternal Grandfather         CVA       Smoking Status:   Social History     Socioeconomic History    Marital status:    Tobacco Use    Smoking status: Never    Smokeless tobacco: Never       ROS  All pertinent positives for the review of systems are mentioned in the HPI  All other organ systems are reviewed and are negative.    Constitutional and vital signs reviewed.      Social History and Family History elements reviewed from today, pertinent positives to the presenting problem noted.    Physical Exam     ED Triage Vitals [02/02/24 1342]   /89   Pulse 78   Resp 18   Temp 98.4 °F (36.9 °C)   Temp src Oral   SpO2 100 %   O2 Device None (Room air)       All measures to prevent infection transmission during my interaction with the patient were taken. The patient was already wearing a droplet mask on my arrival to the room. Personal protective equipment including  droplet mask, eye protection, and gloves were worn throughout the duration of the exam.  Handwashing was performed prior to and after the exam.  Stethoscope and any equipment used during my examination was cleaned with super sani-cloth germicidal wipes following the exam.     Physical Exam  Vitals and nursing note reviewed.   Constitutional:       Appearance: She is well-developed.   HENT:      Head: Normocephalic and atraumatic.      Right Ear: External ear normal.      Left Ear: External ear normal.      Nose: Nose normal.   Eyes:      Conjunctiva/sclera: Conjunctivae normal.      Pupils: Pupils are equal, round, and reactive to light.   Cardiovascular:      Rate and Rhythm: Normal rate and regular rhythm.      Heart sounds: Normal heart sounds.   Pulmonary:      Effort: Pulmonary effort is normal.      Breath sounds: Normal breath sounds.   Abdominal:      General: Bowel sounds are normal. There is no distension.      Palpations: Abdomen is soft.      Tenderness: There is no abdominal tenderness. There is no right CVA tenderness or left CVA tenderness.   Musculoskeletal:         General: Normal range of motion.      Cervical back: Normal range of motion and neck supple.   Skin:     General: Skin is warm and dry.   Neurological:      Mental Status: She is alert and oriented to person, place, and time.      Deep Tendon Reflexes: Reflexes are normal and symmetric.   Psychiatric:         Behavior: Behavior normal.         Thought Content: Thought content normal.         Judgment: Judgment normal.         ED Course        Labs Reviewed   URINALYSIS WITH CULTURE REFLEX - Abnormal; Notable for the following components:       Result Value    Blood Urine Trace (*)     Leukocyte Esterase Urine 25 (*)     Squamous Epi. Cells Few (*)     All other components within normal limits   COMP METABOLIC PANEL (14) - Abnormal; Notable for the following components:    ALT 8 (*)     All other components within normal limits   POCT  PREGNANCY URINE - Normal   CBC WITH DIFFERENTIAL WITH PLATELET    Narrative:     The following orders were created for panel order CBC With Differential With Platelet.                  Procedure                               Abnormality         Status                                     ---------                               -----------         ------                                     CBC W/ DIFFERENTIAL[[id number]]                              Final result                                                 Please view results for these tests on the individual orders.   URINE CULTURE, ROUTINE   CBC W/ DIFFERENTIAL         Imaging Results Available and Reviewed while in ED: No results found.  ED Medications Administered: Medications - No data to display      MDM     Vitals:    02/02/24 1342   BP: 154/89   Pulse: 78   Resp: 18   Temp: 98.4 °F (36.9 °C)   TempSrc: Oral   SpO2: 100%   Weight: 64.4 kg   Height: 157.5 cm (5' 2\")     *I personally reviewed and interpreted all ED vitals.  I also personally reviewed all labs and imaging if ordered    Pulse Ox: 100%, Room air, Normal     Monitor Interpretation:   normal sinus rhythm    Differential Diagnosis/ Diagnostic Considerations: Appendicitis, kidney stone, pyelonephritis, UTI.    Medical Record Review: I personally reviewed available prior medical records for any recent pertinent discharge summaries, testing, and procedures and reviewed those reports.    Complicating Factors: The patient already has does not have any pertinent problems on file. to contribute to the complexity of this ED evaluation.    Medical Decision Making  43-year-old female presents ER with complaint of right-sided flank pain rating to the right lower quadrant.  Patient states her pain is significantly improved today compared to yesterday.  Patient UA negative for urinary tract infection.  Patient blood was.  Patient with scant blood in the urine.  Patient offered a CT abdomen pelvis to rule out a  kidney stone or appendicitis.  Patient states her pain is significantly improved and does not want the CAT scan.  Patient is alert orient x 3 and made aware that the discomfort in her flank and abdomen cannot be truly ruled out without a CAT scan.  Patient understands and states she will return to the ER if symptoms worsen.    Problems Addressed:  Acute right flank pain: acute illness or injury    Amount and/or Complexity of Data Reviewed  External Data Reviewed: labs and notes.     Details: Urgent care notes reviewed.  Patient had a point-of-care urine that showed a positive urinary tract infection yesterday at urgent care  Labs: ordered. Decision-making details documented in ED Course.        Condition upon leaving the department: Stable    Disposition and Plan     Clinical Impression:  1. Acute right flank pain        Disposition:  Discharge    Follow-up:  [facility] Emergency Department  [address]  [facility] [address]  [phone]  Go to  If symptoms worsen      Medications Prescribed:  Current Discharge Medication List

## 2024-02-02 NOTE — ED INITIAL ASSESSMENT (HPI)
Pt to ED with c/o atraumatic right flank pain that radiates to right back for about 2 days. Pt states seen at [facility] yesterday and sent to ED for further evaluation. Pt states wait was too long at [facility] and went home. Pt denies fever or chills. Denies hematuria. No respiratory distress noted. Pt is alert and oriented x4. Pt ambulating by self with steady gait. Pt skin parameters WNL.

## 2024-02-02 NOTE — ED QUICK NOTES
Discharge instructions given to pt. Pt verbalized understanding of home care, and to follow up with PCP. Pt denied further questions or concerns. Pt ambulatory out of ED, discharged in stable condition.

## 2024-09-30 ENCOUNTER — E-ADVICE (OUTPATIENT)
Dept: OBGYN | Age: 44
End: 2024-09-30

## 2024-11-05 ENCOUNTER — OFFICE VISIT (OUTPATIENT)
Dept: OBGYN CLINIC | Facility: CLINIC | Age: 44
End: 2024-11-05

## 2024-11-05 VITALS
WEIGHT: 147.81 LBS | DIASTOLIC BLOOD PRESSURE: 74 MMHG | BODY MASS INDEX: 27 KG/M2 | SYSTOLIC BLOOD PRESSURE: 105 MMHG | HEART RATE: 83 BPM

## 2024-11-05 DIAGNOSIS — Z11.3 SCREENING FOR VENEREAL DISEASE: ICD-10-CM

## 2024-11-05 DIAGNOSIS — Z63.79 STRESSFUL LIFE EVENT AFFECTING FAMILY: ICD-10-CM

## 2024-11-05 DIAGNOSIS — N93.0 PCB (POST COITAL BLEEDING): ICD-10-CM

## 2024-11-05 DIAGNOSIS — Z12.4 SCREENING FOR CERVICAL CANCER: ICD-10-CM

## 2024-11-05 DIAGNOSIS — N95.1 PERIMENOPAUSAL SYMPTOMS: ICD-10-CM

## 2024-11-05 DIAGNOSIS — Z12.31 SCREENING MAMMOGRAM FOR BREAST CANCER: ICD-10-CM

## 2024-11-05 DIAGNOSIS — E03.8 OTHER SPECIFIED HYPOTHYROIDISM: ICD-10-CM

## 2024-11-05 DIAGNOSIS — Z01.411 ENCOUNTER FOR GYNECOLOGICAL EXAMINATION WITH ABNORMAL FINDING: Primary | ICD-10-CM

## 2024-11-05 PROBLEM — R14.0 ABDOMINAL BLOATING: Status: RESOLVED | Noted: 2019-09-10 | Resolved: 2024-11-05

## 2024-11-05 PROCEDURE — 99386 PREV VISIT NEW AGE 40-64: CPT | Performed by: OBSTETRICS & GYNECOLOGY

## 2024-11-05 NOTE — PROGRESS NOTES
Subjective:   Patient ID: Asuncion Turner is a 43 year old female.    HPI   with spotting q 2 months for 3-4d on Mirena placed 2 years ago for menorrhagia. Sadly, her spouse of 24 years had sudden death in . It has been a great struggle. She has a relationship with an old friend. ROS significant for PC bleeding, worsening anxiety, night time flashes and un-intended 10 lb gain in past 5-6 months. Regular exercise with Yoga and gym 3-4d / week.     History/Other:   Review of Systems   Constitutional:  Negative for appetite change, fatigue and unexpected weight change.   Eyes:  Negative for visual disturbance.   Respiratory:  Negative for cough and shortness of breath.    Cardiovascular:  Negative for chest pain, palpitations and leg swelling.   Gastrointestinal:  Negative for abdominal distention, abdominal pain, blood in stool, constipation and diarrhea.   Genitourinary:  Positive for dyspareunia (dryness) and vaginal bleeding (post coital). Negative for dysuria, frequency, menstrual problem, pelvic pain and urgency.   Musculoskeletal:  Negative for arthralgias and myalgias.   Skin:  Negative for rash.   Neurological:  Negative for weakness, numbness and headaches.   Psychiatric/Behavioral:  Negative for dysphoric mood. The patient is nervous/anxious.      Current Outpatient Medications   Medication Sig Dispense Refill    cyclobenzaprine 10 MG Oral Tab Take 1 tablet (10 mg total) by mouth 3 (three) times daily as needed.      gabapentin 300 MG Oral Cap  (Patient not taking: Reported on 2024)      HYDROcodone-acetaminophen 5-325 MG Oral Tab Take 1-2 tablets by mouth every 4 to 6 hours as needed. (Patient not taking: Reported on 2024)      ondansetron (ZOFRAN) 8 MG tablet Take 1 tablet (8 mg total) by mouth every 8 (eight) hours as needed. (Patient not taking: Reported on 2024)       Allergies:Allergies[1]    Objective:   Physical Exam  Constitutional:       General: She is not in  acute distress.     Appearance: She is well-developed. She is not diaphoretic.   Neck:      Thyroid: No thyromegaly.   Cardiovascular:      Rate and Rhythm: Normal rate and regular rhythm.      Heart sounds: Normal heart sounds. No murmur heard.  Pulmonary:      Effort: Pulmonary effort is normal.      Breath sounds: Normal breath sounds. No wheezing or rales.   Chest:   Breasts:     Right: Normal. No mass, nipple discharge, skin change or tenderness.      Left: Normal. No mass, nipple discharge, skin change or tenderness.      Comments:     Abdominal:      General: There is no distension.      Palpations: Abdomen is soft. There is no mass.      Tenderness: There is no abdominal tenderness. There is no guarding or rebound.   Genitourinary:     Labia:         Right: No rash or lesion.         Left: No rash or lesion.       Vagina: Normal. No vaginal discharge.      Cervix: No cervical motion tenderness or discharge.      Uterus: Not enlarged and not tender.       Adnexa:         Right: No mass, tenderness or fullness.          Left: No mass, tenderness or fullness.     Musculoskeletal:         General: No tenderness.      Cervical back: Neck supple.   Lymphadenopathy:      Cervical: No cervical adenopathy.      Upper Body:      Right upper body: No supraclavicular, axillary or pectoral adenopathy.      Left upper body: No supraclavicular, axillary or pectoral adenopathy.   Neurological:      Mental Status: She is alert.         Assessment & Plan:   1. Encounter for gynecological examination with abnormal finding    2. PCB (post coital bleeding)    3. Perimenopausal symptoms    4. Stressful life event affecting family    5. Screening for cervical cancer    6. Screening for venereal disease    7. Screening mammogram for breast cancer    8. Other specified hypothyroidism        Orders Placed This Encounter   Procedures    Hpv Dna  High Risk , Thin Prep Collect    Trichomonas vaginalis, KRISHNA (Vaginal/Cervical)     Estradiol    FSH    Testosterone,Free And Total    TSH and Free T4    ThinPrep PAP Smear    Chlamydia/Gc Amplification       Meds This Visit:  Requested Prescriptions      No prescriptions requested or ordered in this encounter       Imaging & Referrals:  [facility] JOSE 2D+3D SCREENING BILAT (CPT=77067/57693)         [1]   Allergies  Allergen Reactions    Seasonal OTHER (SEE COMMENTS)

## 2024-11-06 LAB
C TRACH DNA SPEC QL NAA+PROBE: NEGATIVE
HPV E6+E7 MRNA CVX QL NAA+PROBE: NEGATIVE
N GONORRHOEA DNA SPEC QL NAA+PROBE: NEGATIVE
T VAGINALIS RRNA SPEC QL NAA+PROBE: NEGATIVE

## 2024-11-07 ENCOUNTER — TELEPHONE (OUTPATIENT)
Dept: OBGYN CLINIC | Facility: CLINIC | Age: 44
End: 2024-11-07

## 2024-12-16 ENCOUNTER — PATIENT MESSAGE (OUTPATIENT)
Dept: OBGYN CLINIC | Facility: CLINIC | Age: 44
End: 2024-12-16

## 2024-12-19 ENCOUNTER — OFFICE VISIT (OUTPATIENT)
Facility: CLINIC | Age: 44
End: 2024-12-19
Payer: COMMERCIAL

## 2024-12-19 VITALS — SYSTOLIC BLOOD PRESSURE: 118 MMHG | DIASTOLIC BLOOD PRESSURE: 74 MMHG | HEART RATE: 82 BPM | OXYGEN SATURATION: 98 %

## 2024-12-19 DIAGNOSIS — E04.2 MULTIPLE THYROID NODULES: ICD-10-CM

## 2024-12-19 DIAGNOSIS — E55.9 VITAMIN D DEFICIENCY: ICD-10-CM

## 2024-12-19 DIAGNOSIS — E06.3 HYPOTHYROIDISM DUE TO HASHIMOTO'S THYROIDITIS: Primary | ICD-10-CM

## 2024-12-19 PROBLEM — M54.50 LOWER BACK PAIN: Status: ACTIVE | Noted: 2022-08-30

## 2024-12-19 PROBLEM — M16.11 ARTHROPATHY OF RIGHT HIP: Status: ACTIVE | Noted: 2022-10-24

## 2024-12-19 PROBLEM — M77.12 LATERAL EPICONDYLITIS, LEFT ELBOW: Status: ACTIVE | Noted: 2017-06-12

## 2024-12-19 PROBLEM — E04.1 THYROID NODULE: Status: ACTIVE | Noted: 2024-12-19

## 2024-12-19 PROCEDURE — 99203 OFFICE O/P NEW LOW 30 MIN: CPT | Performed by: NURSE PRACTITIONER

## 2024-12-19 RX ORDER — MAGNESIUM 30 MG
TABLET ORAL 2 TIMES DAILY
COMMUNITY

## 2024-12-19 RX ORDER — LEVONORGESTREL 52 MG/1
1 INTRAUTERINE DEVICE INTRAUTERINE ONCE
COMMUNITY

## 2024-12-19 RX ORDER — LEVOTHYROXINE SODIUM 150 UG/1
150 TABLET ORAL DAILY
Qty: 30 TABLET | Refills: 0 | Status: SHIPPED | OUTPATIENT
Start: 2024-12-19 | End: 2025-01-18

## 2024-12-19 RX ORDER — LEVOTHYROXINE SODIUM 150 UG/1
1 TABLET ORAL DAILY
COMMUNITY
Start: 2022-08-11 | End: 2024-12-19

## 2024-12-19 RX ORDER — DIAZEPAM 5 MG/1
TABLET ORAL
COMMUNITY
Start: 2022-12-14

## 2024-12-19 RX ORDER — LEVOTHYROXINE SODIUM 150 UG/1
150 TABLET ORAL DAILY
Qty: 30 TABLET | Refills: 0 | Status: SHIPPED | OUTPATIENT
Start: 2024-12-19 | End: 2024-12-19

## 2024-12-19 NOTE — PROGRESS NOTES
ENDOCRINOLOGY, DIABETES, & METABOLISM NOTE   Name: Asuncion Turner    Date: 12/19/24    Subjective:   Asuncion Turner is a 44 year old female with PMHx significant for Hypothyroidism and Thyroid nodule, lumbar stenosis who presents for consultation for Hypothyroidism and Thyroid nodule    She was first diagnosed with Hypothyroidism around age 16  and was started on levothyroxine at that time. She had symptoms of   stomach problem and hair loss back then prior to diagnosis. Currently on levothyroxine 150 mcg daily, no misses  Also  h/o nodules  States she had biopsy to her thyroid, last time was 2 years ago and was benign, and states there was a cyst   Had 3x biopsy was the most recent was a year or a year and a half  ago    History of head or neck radiation :   denies     History of recent iodine contrasted studies :   denies   History of recent neck trauma:  denies   History of neck pain, tenderness, dysphagia, odynophagia:   + dysphagia, pain to swallow, + odynophagia on to right side   History of URI or URI symptoms prior to thyroid disease :  denies   Takes LT4 appropriately :  yes  Misses LT4 : denies   Takes Biotin:  denies   Family history of Graves/Hashimoto or other thyroid related disorders :  yes, multiple: father, MGM, PGF, daughter and son   Fam hx of thyroid cancer: denies  Weight trajectory: noted weight gain,   Menses: sporadic period every 3 months due to IUD   No plans of pregnancy, on mirena IUD placed about 2 years ago for 7 years.   11/19/2024 labs: TSH - 9.23, fT4 - 0.8      Currently taking Thyroid meds: Currently taking LT4 150  mcg PO qAM , no misses    Plan for pregnancy denies on Mirena IUD  Pt endorses: fatigue/weakness, temperature intolerance, and hair loss  she went to OB due to fatigue but cannot sleep , feeling hot at night and hair loss, noted constipation always   Pt denies: vision changes and changes in menses recent illness, weight loss medication use, recent IV  contrast for imaging, amiodarone use, and biotin use    History/Other:    Chief Complaint Reviewed and Verified  Nursing Notes Reviewed and   Verified  Tobacco Reviewed  Allergies Reviewed  Medications Reviewed    Problem List Reviewed  Medical History Reviewed  Surgical History   Reviewed  Family History Reviewed  Social History Reviewed         Tobacco:  Never smoker  Current Outpatient Medications   Medication Sig Dispense Refill    diazePAM 5 MG Oral Tab diazePAM 5 MG Oral Tablet QTY: 2 tablet Days: 1 Refills: 0  Written: 22 Patient Instructions: as directed Take 1-2 tablets 20- 30 minutes before MRI      Levonorgestrel (MIRENA, 52 MG,) 20 MCG/DAY Intrauterine IUD 20 mcg (1 each total) by Intrauterine route one time.      cholecalciferol 125 MCG (5000 UT) Oral Tab Take 1 tablet (5,000 Units total) by mouth daily.      magnesium 30 MG Oral Tab Take by mouth 2 (two) times daily.      levothyroxine 150 MCG Oral Tab Take 1 tablet (150 mcg total) by mouth daily. 30 tablet 0    cyclobenzaprine 10 MG Oral Tab Take 1 tablet (10 mg total) by mouth 3 (three) times daily as needed.      gabapentin 300 MG Oral Cap  (Patient not taking: Reported on 2024)      HYDROcodone-acetaminophen 5-325 MG Oral Tab Take 1-2 tablets by mouth every 4 to 6 hours as needed. (Patient not taking: Reported on 2024)      ondansetron (ZOFRAN) 8 MG tablet Take 1 tablet (8 mg total) by mouth every 8 (eight) hours as needed. (Patient not taking: Reported on 2024)       Allergies[1]  Past Medical History:    Encounter for maternal care for breech presentation in burgos pregnancy ([facility])    Hashimoto's disease    Lumbar stenosis     (normal spontaneous vaginal delivery) ([facility])    x3    Placenta previa ([facility])    Scoliosis    History reviewed. No pertinent surgical history.  Social History     Socioeconomic History    Marital status:    Tobacco Use    Smoking status: Never    Smokeless tobacco: Never   Substance  and Sexual Activity    Alcohol use: Yes     Comment: socially    Drug use: Never    Sexual activity: Yes     Birth control/protection: I.U.D.     Social Drivers of Health      [facility]    Housing Stability     Family History   Problem Relation Age of Onset    Diabetes Father         type 2    Other (hypothyroid) Father         hypothyroidism    Other (hypothyroid) Maternal Grandmother     Heart Disorder Maternal Grandfather     Other (CVA) Maternal Grandfather         CVA    Other (hypothyroid) Paternal Grandfather     Other (hypothyroid) Daughter     Other (thyroid nodule) Daughter     Other (hypothyroid) Son          Review of Systems:  10 point ROS completed, refer to HPI for pertinent positives    Objective:   /74 (BP Location: Left arm, Patient Position: Sitting)   Pulse 82   LMP 09/16/2024 (Approximate)   SpO2 98%  Estimated body mass index is 27.03 kg/m² as calculated from the following:    Height as of 2/2/24: 5' 2\" (1.575 m).    Weight as of 11/5/24: 147 lb 12.8 oz (67 kg).  General Appearance:  Alert, in no acute distress, well developed, Vss stable   Eyes:  normal conjunctivae, sclera, no lid lag, no stare, no proptosis  Ears/Nose/Mouth/Throat: normal hearing, normal speech  NECK: supple, thyroid normal size , no thyroid nodule/s palpated, no lymphadenopathy, no ttp  Respiratory:  breathing comfortably on room air, clear to auscultation bilaterally  Cardiovascular:  regular rate and rhythm, no murmurs, S3 or S4, no peripheral edema  Psychiatric:  Oriented to person, place and time, appropriate mood & affect  Skin: Normal moisture and skin texture  Neuro: sensory grossly intact, motor grossly intact. normal gait. A&Ox4, no tremors      Laboratory Data     Recent Labs: Labs reviewed in EPIC under lab tab on 12/19/2024. Interpretation: subclinical hypothyroid    Lab Results   Component Value Date    T4F 0.8 11/19/2024    TSH 9.23  (H) 11/19/2024      Recent Labs     11/19/24  1458   T4F 0.8   TSH 9.23*          Imaging     Radiology: Personally reviewed on 12/19/2024  DATE OF SERVICE: 11.20.2020    US THYROID (CPT=76536)  CLINICAL INDICATION: Neck pain.  COMPARISON: 9/28/2019.  TECHNIQUE: Gray scale imaging of the thyroid gland was performed and multiple static images were  obtained.  FINDINGS:  RIGHT LOBE: The right lobe of the thyroid gland measures 5.9 x 2.1 x 1.8 cm. Total volume is 11.4  mL.  LEFT LOBE: The left lobe of the thyroid gland measures 4.7 x 1.3 x 1.3 cm. Total volume is 4.3 mL.  ISTHMUS: The isthmus measures up to 0.2 cm.  ECHOTEXTURE:    The right thyroid gland echogenicity appears heterogeneous.  The left thyroid gland echogenicity appears heterogeneous.  VASCULARITY:    The right thyroid gland vascularity appears hypervascular.  The left thyroid gland vascularity appears hypervascular.  NODULES: No discrete thyroid nodules.   Impression   IMPRESSION:  1. Heterogeneous hypervascular thyroid gland without discrete thyroid nodules. The overall size of  the thyroid appears unchanged from previous ultrasound.  ACR TI-RADS recommendations:  TR5 (Greater than or equal to 7 points) -FNA if greater than or equal to 1.0 cm, follow-up if 0.5  -0.9 cm every year for 5 years  TR4 (4-6 points) -FNA if greater than or equal to 1.5 cm, follow-up if 1.0 -1.4 cm in 1, 2, 3 and 5  years  TR3 (3 points)-FNA if greater than or equal to 2.5 cm, follow-up if 1.5 -2.4 cm in 1, 3 and 5 years  TR2 (2 points) & TR1 (0 points) -No FNA or follow-up          Assessment & Plan:        ICD-10-CM    1. Hypothyroidism due to Hashimoto's thyroiditis  E06.3 US THYROID (CPT=76536)     TSH and Free T4     levothyroxine 150 MCG Oral Tab     DISCONTINUED: levothyroxine 150 MCG Oral Tab      2. Multiple thyroid nodules  E04.2       3. Vitamin D deficiency  E55.9 VITAMIN D, 25-HYDROXY [19900][Q]     CANCELED: Vitamin D            Asuncion Arita Yue is a 44 year  old female who presented to clinic for Hypothyroidism and Thyroid nodule     Hypothyroidism  -Dx since since age 16 , currently on Levothyroxine 150 mcg daily,no misses no adjustment recently.   - Pt endorses: fatigue/weakness, temperature intolerance, and hair loss  she went to OB due to fatigue but cannot sleep , feeling hot at night and hair loss, noted constipation always   - 11/19/2024 labs: TSH - 9.23, fT4 - 0.8  - Discussed pathophysiology, symptoms and treatment of Hypothyroidism  - Discussed with pt the proper administration of LT4: ideally first thing in the morning on an empty stomach and taken only with water, separate from all other foods/beverages/medications by 30-60 minutes and 4 hrs apart from other OTC supplements  - Patient is on Mirena IUD, not currently planning to get pregnant  - Discussed that goal of tsh is tighter during preconception and conception stage,  to let us know once actively trying to get pregnant or if conceived. Discussed rationale.  -lab orders given to repeat now  Plan: Repeat labs now and Continue levothyroxine 150 mcg daily and will adjust the dose after the blood test result . Please remember to take your levothyroxine 30 minutes prior to food/rest of your medications and 4-6 hours prior to supplements.     2. Hx of Thyroid Nodule  - last Thyroid US in 2020 does not have nodules, however she reported that she had biopsy 3x in the past due to nodules.Plan: Repeating thyroid US  - Discussed that nodules are more common with increasing age, in women, in those living in geographic areas w/ iodine deficiency and in individuals exposed to ionizing radiation.  - Discussed that factors associated w/ increased risk of  thyroid nodules and goiters include: smoking, shraddha in areas of mild iodine deficiency, alcohol consumption is associated w/ thyroid enlargement , more so in women,     3. Vitamin D deficiency  -On Vit D over the counter 5000 international unit , Plan: checking  level    Return in about 6 weeks (around 1/30/2025) for thyroid follow up.      Total time spent  40 minutes today on obtaining history, reviewing pertinent labs, evaluating patient, providing multiple treatment options, reinforcing diet/exercise and compliance, and completing documentation, of which greater than 50% was spent in face to face discussion with the patient  who demonstrated understanding and agreement with plan.     Thank you for allowing me to participate in the care of this patient.  Please feel free to contact me with any questions.    LAUREEN Escamilla, [facility]  Endocrinology, Diabetes & Metabolism   12/19/24    Note to patient: The 21 Century Cures Act makes medical notes like these available to patients in the interest of transparency. However, be advised this is a medical document. It is intended as peer to peer communication. It is written in medical language and may contain abbreviations or verbiage that are unfamiliar. It may appear blunt or direct. Medical documents are intended to carry relevant information, facts as evident, and the clinical opinion of the practitioner.         [1]   Allergies  Allergen Reactions    Seasonal OTHER (SEE COMMENTS)

## 2024-12-19 NOTE — PATIENT INSTRUCTIONS
Summary of today's visit:    Your thyroid function is  underactive  Levothyroxine: Continue levothyroxine 150 mcg daily and will adjust the dose after the blood test result . Please remember to take your levothyroxine 30 minutes prior to food/rest of your medications and 4-6 hours prior to supplements.   Please have labs done today and if any adjustments made, will check labs 6 weeks after the adjustment   If there is any problem regarding getting your medication filled, please call our office for assistance  .    Return Visit :   [x]  APN Return in about 6 weeks (around 1/30/2025) for thyroid follow up.  [] Video visit  [x]  In person only    [x]  Directions to 1st floor lab     [x]  Central scheduling number for thyroid ultrasound

## 2025-01-21 DIAGNOSIS — E06.3 HYPOTHYROIDISM DUE TO HASHIMOTO'S THYROIDITIS: Primary | ICD-10-CM

## 2025-01-21 LAB
T3, TOTAL: 121 NG/DL (ref 76–181)
T4, FREE: 2 NG/DL (ref 0.8–1.8)
TSH: 0.05 MIU/L

## 2025-01-21 RX ORDER — LEVOTHYROXINE SODIUM 137 UG/1
137 TABLET ORAL
Qty: 30 TABLET | Refills: 1 | Status: SHIPPED | OUTPATIENT
Start: 2025-01-21

## 2025-04-30 ENCOUNTER — TELEPHONE (OUTPATIENT)
Dept: OBGYN CLINIC | Facility: CLINIC | Age: 45
End: 2025-04-30

## 2025-04-30 ENCOUNTER — PATIENT MESSAGE (OUTPATIENT)
Dept: OBGYN CLINIC | Facility: CLINIC | Age: 45
End: 2025-04-30

## 2025-04-30 RX ORDER — ESTRADIOL 0.03 MG/D
1 FILM, EXTENDED RELEASE TRANSDERMAL
Qty: 24 PATCH | Refills: 2 | Status: SHIPPED | OUTPATIENT
Start: 2025-05-01

## 2025-04-30 NOTE — TELEPHONE ENCOUNTER
This RN spoke with patient given her complaint. She has been waiting close to 5 months for her to have results provided to her. She would like to have HRT and testosterone cream be prescribed to her to treat her symptoms. She'd like this to be sent to her pharmacy on file. Pt would just like to have this taken care of for her since she has been waiting for so long and has called multiple times requesting for provider to go over her results. Very disappointed with the manner in which this has not been resolved. She no longer needs any medication treatment for her Hashimoto's as she was referred to a provider and that has been taken care of. She is just requesting the HRT and progesterone medication.  This RN informed pt that provider will be informed and our triage staff or this RN will call her back with resolution.

## 2025-04-30 NOTE — TELEPHONE ENCOUNTER
Patient called asking for a number to file a complaint against Dr. Pedraza.  Patient states she was seen in 11/2024 and had labs done and has been waiting to hear back from Dr. Pedraza regarding results and treatment.  Patient states she came in with a list of symptoms and was expecting care to feel better.  Patient asked that Dr. Pedraza call her.  Patient aware he will be in this afternoon.  Message to Dr. Pedraza.

## 2025-04-30 NOTE — TELEPHONE ENCOUNTER
Patient has been waiting since Nov to get on  HRT . Patient said sent a lot of messages and still waiting

## 2025-05-01 ENCOUNTER — TELEPHONE (OUTPATIENT)
Dept: OBGYN CLINIC | Facility: CLINIC | Age: 45
End: 2025-05-01

## 2025-05-01 DIAGNOSIS — E06.3 HYPOTHYROIDISM DUE TO HASHIMOTO'S THYROIDITIS: ICD-10-CM

## 2025-05-01 RX ORDER — TESTOSTERONE 50 MG/5G
GEL TRANSDERMAL
COMMUNITY

## 2025-05-01 RX ORDER — DIAZEPAM 5 MG/1
TABLET ORAL
Refills: 0 | OUTPATIENT
Start: 2025-05-01

## 2025-05-01 NOTE — TELEPHONE ENCOUNTER
Denied refill for Diazepam. Sent [employer] message informing the patient. Asked to schedule a follow-up visit.

## 2025-05-01 NOTE — TELEPHONE ENCOUNTER
[facility] calling to clarify dosing for Testosterone 2% cream. Directions per Dr. Pedraza are: apply to vulva 3 x weekly at , 3 month supply with 2 refills.     Pharmacist is asking how much to apply per dose? Options are 0.5g, 1 g, or 2 g per application.     To Dr. Pedraza to advise.

## 2025-05-01 NOTE — TELEPHONE ENCOUNTER
I called and spoke with Asuncion for 45 minutes concerning recurrent post-coital bleeding and significant perimenopausal symptoms. ( Severe flashes, fatigue, low metabolic rate, etc ). The PC bleeding does not involve dyspareunia so this is more likely uterine rather than vaginal blood. She also has muscle wasting despite great diet and intense workouts. She has researched bio-identical hormones and wishes to avoid them. I would agree. We discussed other options such as hormonal supplementation with a light dose of estrogen and adding a testosterone cream. After discussion, we decided on Vivelle 0.025 patch and prescription sent to her [facility]. The existing Mirena will protect the endometrium. She'll message with feedback in about 3 months. We'll avoid an E2 cream for now since no real vaginal complaints. Lastly I ordered Pelvic ultrasound to check IUD position and look for any endometrial lesions ( due to the PC bleeding ). Her ultrasound was normal in January 2023. I'll need RN to send prescription to [facility] for Testosterone 2% cream and apply to vulva three times weekly at . Ask for a 3 month supply with 2 refills if possible. Asuncion lives in [place] but works in [place]. Let her know [facility] address and phone. Thanks.

## 2025-05-02 NOTE — TELEPHONE ENCOUNTER
HERNANDEZ from Dr. Pedraza- 0.5 g per application. Directions otherwise as per below.     Notified pharmacy at [facility]. They will fill and let patient know when ready.   She did inform me that testosterone is controlled, so can only refill x 6 mo total, then they will need new prescription. Noted.

## 2025-05-29 ENCOUNTER — PATIENT MESSAGE (OUTPATIENT)
Dept: OBGYN CLINIC | Facility: CLINIC | Age: 45
End: 2025-05-29

## 2025-05-29 NOTE — TELEPHONE ENCOUNTER
Pt is on testosterone 2% cream (apply to vulva 3 x weekly at HS) but states it is burning her vulva and asking for alternative. Message to JENNIFER to please advise. See my chart message.

## 2025-06-24 NOTE — TELEPHONE ENCOUNTER
To Dr. Pedraza and clinical supervisor to please see message below; patient asking for alternative for testosterone 2% cream (apply to vulva 3 x weekly at bedtime), states this prescription is causing her vulva to burn.

## 2025-06-27 ENCOUNTER — TELEPHONE (OUTPATIENT)
Dept: OBGYN CLINIC | Facility: CLINIC | Age: 45
End: 2025-06-27

## 2025-06-27 NOTE — TELEPHONE ENCOUNTER
Please see 5/29/25 mychart.   Supervisor is addressing with Dr. Pedraza in office. We do not have further information for pharmacy.

## 2025-06-29 ENCOUNTER — PATIENT MESSAGE (OUTPATIENT)
Facility: CLINIC | Age: 45
End: 2025-06-29

## 2025-06-30 NOTE — TELEPHONE ENCOUNTER
Spoke to Patient, informed her that I spoke to Dr Pedraza and he will be calling her today. Pt stated she will call us back tomorrow if he does not call her. I apologized for any inconvenience.

## 2025-07-02 NOTE — TELEPHONE ENCOUNTER
Endo FD- pls schedule patient for follow up with one of the endo physicians for hypothyroid, noted no future appt, Last office visit 12/19/24.     I responded in my chart message.   
Patient states she is on NP now, following new provider  
Routed for review.   
Statement Selected

## 2025-07-10 ENCOUNTER — PATIENT MESSAGE (OUTPATIENT)
Dept: OBGYN CLINIC | Facility: CLINIC | Age: 45
End: 2025-07-10

## 2025-07-10 ENCOUNTER — E-ADVICE (OUTPATIENT)
Dept: OBGYN | Age: 45
End: 2025-07-10

## 2025-07-16 LAB
ESTRADIOL: 63 PG/ML
FREE TESTOSTERONE: 12.9 PG/ML (ref 0.1–6.4)
SEX HORMONE BINDING$GLOBULIN: 41 NMOL/L (ref 17–124)
TESTOSTERONE, TOTAL,$/MS/MS: 108 NG/DL (ref 2–45)

## (undated) NOTE — LETTER
Label Here    [facility]   PSC HOLD-REQ   [facility]              [facility]  TM PSC HOLD-REQ - [facility]     Account #: [id number] Order Date: 7/7/2025   Bill Type: Third-Party     LAB REF #: [id number] Order Time:  3:46 PM           Client / Ordering Site Information: Physician Information:   Account Name: [facility]   Address 1: [address] [place] [address]   Address 2:     [facility] Zip: [place] [address]   Phone: [phone]   Fax: [phone]    Ordering: Dario Pedraza   Degree:    NPI: [id number]   UPIN:     Physician ID:           Patient Information:   Name: RAMIREZ HARGROVE   Legal Sex: Female   SSN: [id number]   Patient ID: [id number]    YOB: 1980 (44 years)   Phone: [phone]   Address: [address] [place] [address]            Responsible Party / Guarantor Information:   Name: RAMIREZ HARGROVE   Address: [address] [place] [address] Zip: [place] [address]   Phone: [phone]   Relation to Pt: Self   Employer Name: [employer]         ABN:      Worker's Comp: N Date of Injury:              Insurance Information:   Primary Insurance: Secondary Insurance:   Ins Co Name: [facility] ALL OTHER   Address 1: [facility] [id number]   Address 2:     [facility] Zip: [place] [address]   Policy Number: [id number]   Group #: [id number]    Ins Co Name:     Address 1:     Address 2:     City, State Zip:     Policy Number:     Group #:        Primary Policy Sheets / Insured: Secondary Policy Sheets / Insured:   Name: RAMIREZ HARGROVE   Address: [address] [place] [id number]   Pt Relation to Subscriber: Self    Name:     Address:          Pt Relation to Subscriber:              Specimen Type:   (54077) Testosterone,Free And Total:   Blood            Comments:         Order Code Tests Ordered (Total: 1)    Order Code Tests Ordered      70510 Testosterone,Free And Total               Diagnosis Codes:   N95.1                  Bill Type: Third-Party    Ins Code:                                                                    [facility]   - [facility]     Account #: [id number] Order Date: 7/7/2025           EPIC ORD #:   Order Time:  3:46 PM             Client / Ordering Site Information: Physician Information:   Account Name: [facility]   Address 1: [address] [place] [address]   Address 2:     [facility] Zip: [place] [address]   Phone: [phone]   Fax: [phone]    Ordering: Dario Pedraza   Degree:    NPI: [id number]   UPIN:     Physician ID:           Patient Information:   Name: RAMIREZ HARGROVE DEVAUGHN   Legal Sex: Female   SSN: [id number]   Patient ID: [id number]    YOB: 1980 (44 years)   Phone: [phone]   Address: [address] [place] [address]                   Prim. Insurance: [employer] Sec. Insurance:    Ins Code: [employer] Ins Code:     Plan: [facility] ALL OTHER Plan:     Address: [facility] [id number] [place] [id number] Address:     Policy #: [id number] Group #: [id number] Policy #:   Group #:           Order: Sex Horm Binding Glob, Serum (SHBG)   Order Priority: Routine   Order class: Normal   Specimen source:   Comments:   Status: Future  Count: 1   Interval:   Questions:   Release to patient: Immediate         Responsible Party / Guarantor Information:   Name: RAMIREZ HARGROVE   Address: [address] [place] [address] Zip: [place] [address]   Phone: [phone]   Relation to Pt: Self   Employer Name: [employer]         ABN:      Worker's Comp: N Date of Injury:              Diagnosis Codes:   N95.1                 SIGNED ORDER ON FILE  Authorizing Provider: Dario Pedraza DO [NPI: [id number]]  Date: Jul 7, 2025 at 3:46 PM  Ordering Department: [facility]                                                                                 Label Here    [facility]   [facility]   [facility]               [employer]  [facility] HOLD-REQ - [place] [employer]     Account #: [id number] Order Date: 7/7/2025   Bill Type: Third-Party     LAB REF #: [id number] Order Time:  3:31 PM           Client / Ordering Site Information: Physician Information:   Account Name: [facility] [facility]   Address 1: [address] [place] [address]   Address 2:     [facility] Zip: [place] [address]   Phone: [phone]   Fax: [phone]    Ordering: Dario Pedraza   Degree:    NPI: [id number]   UPIN:     Physician ID:           Patient Information:   Name: RAMIREZ HARGROVE   Legal Sex: Female   SSN: [id number]   Patient ID: [id number]    YOB: 1980 (44 years)   Phone: [phone]   Address: [address] [place] [address]            Responsible Party / Guarantor Information:   Name: RAMIREZ HARGROVE   Address: [address] [place] [address] Zip: [place] [address]   Phone: [phone]   Relation to Pt: Self   Employer Name: [employer]         ABN:      Worker's Comp: N Date of Injury:              Insurance Information:   Primary Insurance: Secondary Insurance:   Ins Co Name: [facility] ALL OTHER   Address 1: [facility] [id number]   Address 2:     [facility] Zip: [place] [address]   Policy Number: [id number]   Group #: [id number]    Ins Co Name:     Address 1:     Address 2:     City, State Zip:     Policy Number:     Group #:        Primary Policy Sheets / Insured: Secondary Policy Sheets / Insured:   Name: RAMIREZ HARGROVE   Address: [address] [place] [id number]   Pt Relation to Subscriber: Self    Name:     Address:          Pt Relation to Subscriber:              Specimen Type:   (4021) Estradiol:   Blood            Comments:         Order Code Tests Ordered (Total: 1)    Order Code Tests Ordered      4021 Estradiol               Diagnosis Codes:   N95.1                 Bill Type: Third-Party    Ins Code: